# Patient Record
Sex: MALE | Race: WHITE
[De-identification: names, ages, dates, MRNs, and addresses within clinical notes are randomized per-mention and may not be internally consistent; named-entity substitution may affect disease eponyms.]

---

## 2018-09-26 ENCOUNTER — APPOINTMENT (OUTPATIENT)
Dept: DERMATOLOGY | Facility: CLINIC | Age: 53
End: 2018-09-26
Payer: COMMERCIAL

## 2018-09-26 VITALS — BODY MASS INDEX: 34.49 KG/M2 | HEIGHT: 65 IN | WEIGHT: 207 LBS

## 2018-09-26 DIAGNOSIS — D48.9 NEOPLASM OF UNCERTAIN BEHAVIOR, UNSPECIFIED: ICD-10-CM

## 2018-09-26 DIAGNOSIS — B07.9 VIRAL WART, UNSPECIFIED: ICD-10-CM

## 2018-09-26 DIAGNOSIS — L73.8 OTHER SPECIFIED FOLLICULAR DISORDERS: ICD-10-CM

## 2018-09-26 DIAGNOSIS — L91.8 OTHER HYPERTROPHIC DISORDERS OF THE SKIN: ICD-10-CM

## 2018-09-26 PROCEDURE — 99203 OFFICE O/P NEW LOW 30 MIN: CPT | Mod: 25

## 2018-09-26 PROCEDURE — 17110 DESTRUCTION B9 LES UP TO 14: CPT | Mod: 59

## 2018-09-26 PROCEDURE — 11100 BX SKIN SUBCUTANEOUS&/MUCOUS MEMBRANE 1 LESION: CPT

## 2018-09-28 ENCOUNTER — MOBILE ON CALL (OUTPATIENT)
Age: 53
End: 2018-09-28

## 2018-09-28 LAB — CORE LAB BIOPSY: NORMAL

## 2019-01-08 ENCOUNTER — APPOINTMENT (OUTPATIENT)
Dept: DERMATOLOGY | Facility: CLINIC | Age: 54
End: 2019-01-08

## 2019-05-22 ENCOUNTER — APPOINTMENT (OUTPATIENT)
Dept: PULMONOLOGY | Facility: CLINIC | Age: 54
End: 2019-05-22
Payer: MEDICARE

## 2019-05-22 VITALS
SYSTOLIC BLOOD PRESSURE: 138 MMHG | WEIGHT: 200 LBS | HEART RATE: 97 BPM | DIASTOLIC BLOOD PRESSURE: 80 MMHG | BODY MASS INDEX: 33.28 KG/M2 | OXYGEN SATURATION: 99 %

## 2019-05-22 PROCEDURE — 99214 OFFICE O/P EST MOD 30 MIN: CPT

## 2019-05-22 RX ORDER — GABAPENTIN 300 MG/1
300 CAPSULE ORAL
Refills: 0 | Status: ACTIVE | COMMUNITY

## 2019-05-22 RX ORDER — SERTRALINE HYDROCHLORIDE 50 MG/1
50 TABLET, FILM COATED ORAL
Refills: 0 | Status: ACTIVE | COMMUNITY

## 2019-05-22 RX ORDER — GEMFIBROZIL 600 MG/1
600 TABLET, FILM COATED ORAL
Refills: 0 | Status: ACTIVE | COMMUNITY

## 2019-05-22 RX ORDER — METFORMIN HYDROCHLORIDE 1000 MG/1
1000 TABLET, COATED ORAL
Qty: 180 | Refills: 0 | Status: ACTIVE | COMMUNITY
Start: 2019-03-11

## 2019-05-22 NOTE — CONSULT LETTER
[Dear  ___] : Dear  [unfilled], [Consult Letter:] : I had the pleasure of evaluating your patient, [unfilled]. [Please see my note below.] : Please see my note below. [Consult Closing:] : Thank you very much for allowing me to participate in the care of this patient.  If you have any questions, please do not hesitate to contact me. [Sincerely,] : Sincerely, [DrNikunj  ___] : Dr. RAHMAN

## 2019-07-15 NOTE — REVIEW OF SYSTEMS
[As Noted in HPI] : as noted in HPI [Snoring] : snoring [Nonrestorative Sleep] : nonrestorative sleep [Awakes With Dry Mouth] : awakes with dry mouth [Negative] : Pulmonary Hypertension [Difficulty Initiating Sleep] : no difficulty falling asleep [Difficulty Maintaining Sleep] : no difficulty maintaining sleep [Dysesthesias] : no dysesthesias [Paresthesias] : no paresthesias [Unusual Sleep Behavior] : no unusual sleep behavior [Unusual Movements] : no involuntary movements during sleep [Witnessed Apneas] : demonstrated no ~M apnea [Awakes With Headache] : awakes without a headache [Hypersomnolence] : not sleeping much more than usual [Cataplexy] :  no cataplexy [Sleep Paralysis] : no sleep paralysis [Hypnogogic Hallucinations] : no hypnogogic hallucinations [Hypnopompic Hallucinations] : no hypnopompic hallucinations
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
## 2019-07-15 NOTE — PHYSICAL EXAM
[Normal Conjunctiva] : the conjunctiva exhibited no abnormalities [Eyelids - No Xanthelasma] : the eyelids demonstrated no xanthelasmas [Low Lying Soft Palate] : low lying soft palate [Elongated Uvula] : elongated uvula [III] : III [Neck Circumference: ___] : neck circumference is [unfilled] [Heart Rate And Rhythm] : heart rate and rhythm were normal [Heart Sounds] : normal S1 and S2 [Murmurs] : no murmurs present [Respiration, Rhythm And Depth] : normal respiratory rhythm and effort [Exaggerated Use Of Accessory Muscles For Inspiration] : no accessory muscle use [Auscultation Breath Sounds / Voice Sounds] : lungs were clear to auscultation bilaterally [Abdomen Soft] : soft [Abdomen Tenderness] : non-tender [Abdomen Mass (___ Cm)] : no abdominal mass palpated [Abnormal Walk] : normal gait [Gait - Sufficient For Exercise Testing] : the gait was sufficient for exercise testing [Nail Clubbing] : no clubbing of the fingernails [Cyanosis, Localized] : no localized cyanosis [Petechial Hemorrhages (___cm)] : no petechial hemorrhages [Deep Tendon Reflexes (DTR)] : deep tendon reflexes were 2+ and symmetric [Sensation] : the sensory exam was normal to light touch and pinprick [No Focal Deficits] : no focal deficits [Oriented To Time, Place, And Person] : oriented to person, place, and time [Impaired Insight] : insight and judgment were intact [Affect] : the affect was normal [Skin Color & Pigmentation] : normal skin color and pigmentation [Skin Turgor] : normal skin turgor [] : no rash [FreeTextEntry1] : obese

## 2019-08-30 ENCOUNTER — EMERGENCY (EMERGENCY)
Facility: HOSPITAL | Age: 54
LOS: 1 days | Discharge: HOME | End: 2019-08-30
Attending: EMERGENCY MEDICINE
Payer: MEDICARE

## 2019-08-30 VITALS
WEIGHT: 179.9 LBS | TEMPERATURE: 99 F | HEIGHT: 65 IN | OXYGEN SATURATION: 98 % | HEART RATE: 85 BPM | DIASTOLIC BLOOD PRESSURE: 63 MMHG | SYSTOLIC BLOOD PRESSURE: 109 MMHG | RESPIRATION RATE: 18 BRPM

## 2019-08-30 PROCEDURE — 99284 EMERGENCY DEPT VISIT MOD MDM: CPT

## 2019-08-30 PROCEDURE — 99053 MED SERV 10PM-8AM 24 HR FAC: CPT

## 2019-08-30 NOTE — ED ADULT TRIAGE NOTE - CHIEF COMPLAINT QUOTE
patient states that he has abdominal pain without nausea and vomiting, states possible hernia protrusion

## 2019-08-31 VITALS
RESPIRATION RATE: 18 BRPM | TEMPERATURE: 98 F | DIASTOLIC BLOOD PRESSURE: 71 MMHG | HEART RATE: 66 BPM | OXYGEN SATURATION: 99 %

## 2019-08-31 LAB
ALBUMIN SERPL ELPH-MCNC: 4.2 G/DL — SIGNIFICANT CHANGE UP (ref 3.3–5.2)
ALP SERPL-CCNC: 80 U/L — SIGNIFICANT CHANGE UP (ref 40–120)
ALT FLD-CCNC: 85 U/L — HIGH
ANION GAP SERPL CALC-SCNC: 14 MMOL/L — SIGNIFICANT CHANGE UP (ref 5–17)
APPEARANCE UR: CLEAR — SIGNIFICANT CHANGE UP
APTT BLD: 33.4 SEC — SIGNIFICANT CHANGE UP (ref 27.5–36.3)
AST SERPL-CCNC: 52 U/L — HIGH
BASOPHILS # BLD AUTO: 0.05 K/UL — SIGNIFICANT CHANGE UP (ref 0–0.2)
BASOPHILS NFR BLD AUTO: 0.7 % — SIGNIFICANT CHANGE UP (ref 0–2)
BILIRUB SERPL-MCNC: 0.3 MG/DL — LOW (ref 0.4–2)
BILIRUB UR-MCNC: NEGATIVE — SIGNIFICANT CHANGE UP
BUN SERPL-MCNC: 8 MG/DL — SIGNIFICANT CHANGE UP (ref 8–20)
CALCIUM SERPL-MCNC: 9.8 MG/DL — SIGNIFICANT CHANGE UP (ref 8.6–10.2)
CHLORIDE SERPL-SCNC: 102 MMOL/L — SIGNIFICANT CHANGE UP (ref 98–107)
CO2 SERPL-SCNC: 24 MMOL/L — SIGNIFICANT CHANGE UP (ref 22–29)
COLOR SPEC: YELLOW — SIGNIFICANT CHANGE UP
CREAT SERPL-MCNC: 0.75 MG/DL — SIGNIFICANT CHANGE UP (ref 0.5–1.3)
DIFF PNL FLD: NEGATIVE — SIGNIFICANT CHANGE UP
EOSINOPHIL # BLD AUTO: 0.19 K/UL — SIGNIFICANT CHANGE UP (ref 0–0.5)
EOSINOPHIL NFR BLD AUTO: 2.7 % — SIGNIFICANT CHANGE UP (ref 0–6)
GLUCOSE SERPL-MCNC: 154 MG/DL — HIGH (ref 70–115)
GLUCOSE UR QL: NEGATIVE MG/DL — SIGNIFICANT CHANGE UP
HCT VFR BLD CALC: 41.9 % — SIGNIFICANT CHANGE UP (ref 39–50)
HGB BLD-MCNC: 13.8 G/DL — SIGNIFICANT CHANGE UP (ref 13–17)
IMM GRANULOCYTES NFR BLD AUTO: 0.1 % — SIGNIFICANT CHANGE UP (ref 0–1.5)
INR BLD: 1.07 RATIO — SIGNIFICANT CHANGE UP (ref 0.88–1.16)
KETONES UR-MCNC: NEGATIVE — SIGNIFICANT CHANGE UP
LACTATE BLDV-MCNC: 2.2 MMOL/L — HIGH (ref 0.5–2)
LEUKOCYTE ESTERASE UR-ACNC: NEGATIVE — SIGNIFICANT CHANGE UP
LIDOCAIN IGE QN: 23 U/L — SIGNIFICANT CHANGE UP (ref 22–51)
LYMPHOCYTES # BLD AUTO: 2.47 K/UL — SIGNIFICANT CHANGE UP (ref 1–3.3)
LYMPHOCYTES # BLD AUTO: 34.5 % — SIGNIFICANT CHANGE UP (ref 13–44)
MAGNESIUM SERPL-MCNC: 1.9 MG/DL — SIGNIFICANT CHANGE UP (ref 1.6–2.6)
MCHC RBC-ENTMCNC: 28 PG — SIGNIFICANT CHANGE UP (ref 27–34)
MCHC RBC-ENTMCNC: 32.9 GM/DL — SIGNIFICANT CHANGE UP (ref 32–36)
MCV RBC AUTO: 85 FL — SIGNIFICANT CHANGE UP (ref 80–100)
MONOCYTES # BLD AUTO: 0.71 K/UL — SIGNIFICANT CHANGE UP (ref 0–0.9)
MONOCYTES NFR BLD AUTO: 9.9 % — SIGNIFICANT CHANGE UP (ref 2–14)
NEUTROPHILS # BLD AUTO: 3.73 K/UL — SIGNIFICANT CHANGE UP (ref 1.8–7.4)
NEUTROPHILS NFR BLD AUTO: 52.1 % — SIGNIFICANT CHANGE UP (ref 43–77)
NITRITE UR-MCNC: NEGATIVE — SIGNIFICANT CHANGE UP
PH UR: 6.5 — SIGNIFICANT CHANGE UP (ref 5–8)
PLATELET # BLD AUTO: 213 K/UL — SIGNIFICANT CHANGE UP (ref 150–400)
POTASSIUM SERPL-MCNC: 4.1 MMOL/L — SIGNIFICANT CHANGE UP (ref 3.5–5.3)
POTASSIUM SERPL-SCNC: 4.1 MMOL/L — SIGNIFICANT CHANGE UP (ref 3.5–5.3)
PROT SERPL-MCNC: 6.7 G/DL — SIGNIFICANT CHANGE UP (ref 6.6–8.7)
PROT UR-MCNC: NEGATIVE MG/DL — SIGNIFICANT CHANGE UP
PROTHROM AB SERPL-ACNC: 12.3 SEC — SIGNIFICANT CHANGE UP (ref 10–12.9)
RBC # BLD: 4.93 M/UL — SIGNIFICANT CHANGE UP (ref 4.2–5.8)
RBC # FLD: 13.7 % — SIGNIFICANT CHANGE UP (ref 10.3–14.5)
SODIUM SERPL-SCNC: 140 MMOL/L — SIGNIFICANT CHANGE UP (ref 135–145)
SP GR SPEC: 1.01 — SIGNIFICANT CHANGE UP (ref 1.01–1.02)
UROBILINOGEN FLD QL: NEGATIVE MG/DL — SIGNIFICANT CHANGE UP
WBC # BLD: 7.16 K/UL — SIGNIFICANT CHANGE UP (ref 3.8–10.5)
WBC # FLD AUTO: 7.16 K/UL — SIGNIFICANT CHANGE UP (ref 3.8–10.5)

## 2019-08-31 PROCEDURE — 83605 ASSAY OF LACTIC ACID: CPT

## 2019-08-31 PROCEDURE — 83735 ASSAY OF MAGNESIUM: CPT

## 2019-08-31 PROCEDURE — 74177 CT ABD & PELVIS W/CONTRAST: CPT | Mod: 26

## 2019-08-31 PROCEDURE — 85610 PROTHROMBIN TIME: CPT

## 2019-08-31 PROCEDURE — 99284 EMERGENCY DEPT VISIT MOD MDM: CPT

## 2019-08-31 PROCEDURE — 80053 COMPREHEN METABOLIC PANEL: CPT

## 2019-08-31 PROCEDURE — 36415 COLL VENOUS BLD VENIPUNCTURE: CPT

## 2019-08-31 PROCEDURE — T1013: CPT

## 2019-08-31 PROCEDURE — 85027 COMPLETE CBC AUTOMATED: CPT

## 2019-08-31 PROCEDURE — 74177 CT ABD & PELVIS W/CONTRAST: CPT

## 2019-08-31 PROCEDURE — 83690 ASSAY OF LIPASE: CPT

## 2019-08-31 PROCEDURE — 81003 URINALYSIS AUTO W/O SCOPE: CPT

## 2019-08-31 PROCEDURE — 85730 THROMBOPLASTIN TIME PARTIAL: CPT

## 2019-08-31 RX ORDER — SODIUM CHLORIDE 9 MG/ML
999 INJECTION INTRAMUSCULAR; INTRAVENOUS; SUBCUTANEOUS ONCE
Refills: 0 | Status: COMPLETED | OUTPATIENT
Start: 2019-08-31 | End: 2019-08-31

## 2019-08-31 RX ADMIN — SODIUM CHLORIDE 249.75 MILLILITER(S): 9 INJECTION INTRAMUSCULAR; INTRAVENOUS; SUBCUTANEOUS at 04:19

## 2019-08-31 NOTE — ED PROVIDER NOTE - PATIENT PORTAL LINK FT
You can access the FollowMyHealth Patient Portal offered by [facility] by registering at the following website: [email or web address]. By joining [employer]s FollowMyHealth portal, you will also be able to view your health information using other applications (apps) compatible with our system.

## 2019-08-31 NOTE — ED PROVIDER NOTE - PHYSICAL EXAMINATION
Constitutional : Appears uncomfortable, talking in short sentences  Head :NC AT , no swelling  Eyes :eomi, no swelling  Mouth :mm dry,  Neck : supple, trachea in midline  Chest :Maged air entry, symm chest expansion, no distress  Heart :S1 S2 distant  Abdomen :abd distended, periumbilical erythema, umbilical hernia reducable on exam with some discomfort  Musc/Skel :ext no swelling, no deformity, no spine tenderness, distal pulses present  Neuro  :AAO 3 no focal deficits

## 2019-08-31 NOTE — ED ADULT NURSE REASSESSMENT NOTE - NS ED NURSE REASSESS COMMENT FT1
Patient received awake and alert x4, patient has had no nausea, vomiting, diarrhea.  Patient FERRARI.  Patient has no labored breathing and is in no acute distress.

## 2019-08-31 NOTE — ED PROVIDER NOTE - OBJECTIVE STATEMENT
: Corina (RN)  53yo M pt with pmhx of osteoarthritis, htn, hld, presents to the ED c/o the ed co abdominal pain beginning 2 days ago. Pt also complains of abdominal distension.   Denies fever, chills, diaphoresis, nausea, vomiting, diarrhea. 55yo M pt with pmhx of osteoarthritis, htn, hld, presents to the ED c/o the ed co abdominal pain beginning 2 days ago. Pt also complains of abdominal distension.   Denies fever, chills, diaphoresis, nausea, vomiting, diarrhea.

## 2019-08-31 NOTE — ED PROVIDER NOTE - NS ED ROS FT
no weight change, no fever or chills  no recent travel, no recent abox, no sick contacts  no recent change in medications  no rash, no bruises  no visual changes no eye discharge  no cough cold or congestion,   no sob, no chest pain  no orthopnea, no pnd  + abd pain and distension, no n/v/d  no hematuria, no change in urinary habits  no joint pain, no deformity  no headache, no paresthesia

## 2019-08-31 NOTE — ED PROVIDER NOTE - CLINICAL SUMMARY MEDICAL DECISION MAKING FREE TEXT BOX
y/o M with history of Dm presents to the ED c/o incarcerated umbilic hernia, which was reduced in ED. Plan to check lactic acid, labs. Will CT abdomen and pelvis with CT contrast and reevaluate. 55 y/o M with history of Dm presents to the ED c/o incarcerated umbilic hernia, which was reduced in ED. Plan to check lactic acid, labs. Will CT abdomen and pelvis with CT contrast and reevaluate.

## 2019-10-07 ENCOUNTER — APPOINTMENT (OUTPATIENT)
Dept: NEUROLOGY | Facility: CLINIC | Age: 54
End: 2019-10-07
Payer: MEDICARE

## 2019-10-07 VITALS
WEIGHT: 200 LBS | HEIGHT: 65 IN | DIASTOLIC BLOOD PRESSURE: 80 MMHG | BODY MASS INDEX: 33.32 KG/M2 | SYSTOLIC BLOOD PRESSURE: 116 MMHG

## 2019-10-07 DIAGNOSIS — Z86.39 PERSONAL HISTORY OF OTHER ENDOCRINE, NUTRITIONAL AND METABOLIC DISEASE: ICD-10-CM

## 2019-10-07 DIAGNOSIS — G62.9 POLYNEUROPATHY, UNSPECIFIED: ICD-10-CM

## 2019-10-07 PROCEDURE — 99204 OFFICE O/P NEW MOD 45 MIN: CPT

## 2019-10-31 ENCOUNTER — APPOINTMENT (OUTPATIENT)
Dept: NEUROLOGY | Facility: CLINIC | Age: 54
End: 2019-10-31
Payer: MEDICARE

## 2019-10-31 DIAGNOSIS — R20.2 PARESTHESIA OF SKIN: ICD-10-CM

## 2019-10-31 PROCEDURE — 95911 NRV CNDJ TEST 9-10 STUDIES: CPT

## 2019-10-31 PROCEDURE — 95886 MUSC TEST DONE W/N TEST COMP: CPT

## 2019-11-27 NOTE — ED ADULT NURSE REASSESSMENT NOTE - GASTROINTESTINAL ASSESSMENT
The patient's INR was 2.5 today and within goal. INR goal is between 2.0 and 3.0. Instructed the patient to continue his current dosing schedule and return in 2 weeks. - - -

## 2019-12-07 ENCOUNTER — OUTPATIENT (OUTPATIENT)
Dept: OUTPATIENT SERVICES | Facility: HOSPITAL | Age: 54
LOS: 1 days | End: 2019-12-07
Payer: MEDICARE

## 2019-12-07 DIAGNOSIS — G47.37 CENTRAL SLEEP APNEA IN CONDITIONS CLASSIFIED ELSEWHERE: ICD-10-CM

## 2019-12-07 PROCEDURE — 95811 POLYSOM 6/>YRS CPAP 4/> PARM: CPT | Mod: 26

## 2019-12-07 PROCEDURE — 95811 POLYSOM 6/>YRS CPAP 4/> PARM: CPT

## 2019-12-22 DIAGNOSIS — M54.12 RADICULOPATHY, CERVICAL REGION: ICD-10-CM

## 2019-12-24 PROBLEM — M54.12 CERVICAL RADICULOPATHY: Status: ACTIVE | Noted: 2019-12-24

## 2020-02-25 ENCOUNTER — APPOINTMENT (OUTPATIENT)
Dept: PULMONOLOGY | Facility: CLINIC | Age: 55
End: 2020-02-25
Payer: MEDICARE

## 2020-02-25 VITALS
WEIGHT: 200 LBS | SYSTOLIC BLOOD PRESSURE: 118 MMHG | HEIGHT: 66 IN | DIASTOLIC BLOOD PRESSURE: 80 MMHG | BODY MASS INDEX: 32.14 KG/M2 | OXYGEN SATURATION: 96 % | HEART RATE: 88 BPM | RESPIRATION RATE: 14 BRPM

## 2020-02-25 DIAGNOSIS — G47.31 PRIMARY CENTRAL SLEEP APNEA: ICD-10-CM

## 2020-02-25 PROCEDURE — 99214 OFFICE O/P EST MOD 30 MIN: CPT

## 2020-02-25 NOTE — CONSULT LETTER
[Dear  ___] : Dear  [unfilled], [Consult Letter:] : I had the pleasure of evaluating your patient, [unfilled]. [Consult Closing:] : Thank you very much for allowing me to participate in the care of this patient.  If you have any questions, please do not hesitate to contact me. [Please see my note below.] : Please see my note below. [Sincerely,] : Sincerely, [DrNikunj  ___] : Dr. RAHMAN

## 2020-02-25 NOTE — REVIEW OF SYSTEMS
[As Noted in HPI] : as noted in HPI [Snoring] : snoring [Nonrestorative Sleep] : nonrestorative sleep [Awakes With Dry Mouth] : awakes with dry mouth [Negative] : Pulmonary Hypertension [Difficulty Initiating Sleep] : no difficulty falling asleep [Difficulty Maintaining Sleep] : no difficulty maintaining sleep [Unusual Sleep Behavior] : no unusual sleep behavior [Paresthesias] : no paresthesias [Dysesthesias] : no dysesthesias [Witnessed Apneas] : demonstrated no ~M apnea [Unusual Movements] : no involuntary movements during sleep [Awakes With Headache] : awakes without a headache [Hypersomnolence] : not sleeping much more than usual [Cataplexy] :  no cataplexy [Sleep Paralysis] : no sleep paralysis [Hypnopompic Hallucinations] : no hypnopompic hallucinations [Hypnogogic Hallucinations] : no hypnogogic hallucinations
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
## 2020-02-25 NOTE — PHYSICAL EXAM
[Normal Conjunctiva] : the conjunctiva exhibited no abnormalities [Eyelids - No Xanthelasma] : the eyelids demonstrated no xanthelasmas [Low Lying Soft Palate] : low lying soft palate [Elongated Uvula] : elongated uvula [III] : III [Neck Circumference: ___] : neck circumference is [unfilled] [Heart Rate And Rhythm] : heart rate and rhythm were normal [Heart Sounds] : normal S1 and S2 [Murmurs] : no murmurs present [Respiration, Rhythm And Depth] : normal respiratory rhythm and effort [Exaggerated Use Of Accessory Muscles For Inspiration] : no accessory muscle use [Auscultation Breath Sounds / Voice Sounds] : lungs were clear to auscultation bilaterally [Abdomen Tenderness] : non-tender [Abdomen Soft] : soft [Abdomen Mass (___ Cm)] : no abdominal mass palpated [Abnormal Walk] : normal gait [Gait - Sufficient For Exercise Testing] : the gait was sufficient for exercise testing [Nail Clubbing] : no clubbing of the fingernails [Cyanosis, Localized] : no localized cyanosis [Petechial Hemorrhages (___cm)] : no petechial hemorrhages [Deep Tendon Reflexes (DTR)] : deep tendon reflexes were 2+ and symmetric [Sensation] : the sensory exam was normal to light touch and pinprick [No Focal Deficits] : no focal deficits [Oriented To Time, Place, And Person] : oriented to person, place, and time [Impaired Insight] : insight and judgment were intact [Affect] : the affect was normal [Skin Color & Pigmentation] : normal skin color and pigmentation [Skin Turgor] : normal skin turgor [] : no rash [FreeTextEntry1] : obese

## 2020-08-26 ENCOUNTER — APPOINTMENT (OUTPATIENT)
Dept: PULMONOLOGY | Facility: CLINIC | Age: 55
End: 2020-08-26

## 2020-12-31 PROBLEM — B07.9 VERRUCA: Status: ACTIVE | Noted: 2018-09-26

## 2021-07-14 ENCOUNTER — EMERGENCY (EMERGENCY)
Facility: HOSPITAL | Age: 56
LOS: 1 days | Discharge: HOME | End: 2021-07-14
Attending: STUDENT IN AN ORGANIZED HEALTH CARE EDUCATION/TRAINING PROGRAM
Payer: MEDICARE

## 2021-07-14 VITALS
SYSTOLIC BLOOD PRESSURE: 144 MMHG | OXYGEN SATURATION: 99 % | HEART RATE: 73 BPM | WEIGHT: 199.96 LBS | HEIGHT: 65 IN | RESPIRATION RATE: 20 BRPM | DIASTOLIC BLOOD PRESSURE: 83 MMHG | TEMPERATURE: 98 F

## 2021-07-14 LAB
ALBUMIN SERPL ELPH-MCNC: 4.2 G/DL — SIGNIFICANT CHANGE UP (ref 3.3–5.2)
ALP SERPL-CCNC: 67 U/L — SIGNIFICANT CHANGE UP (ref 40–120)
ALT FLD-CCNC: 30 U/L — SIGNIFICANT CHANGE UP
ANION GAP SERPL CALC-SCNC: 9 MMOL/L — SIGNIFICANT CHANGE UP (ref 5–17)
AST SERPL-CCNC: 20 U/L — SIGNIFICANT CHANGE UP
BASOPHILS # BLD AUTO: 0.04 K/UL — SIGNIFICANT CHANGE UP (ref 0–0.2)
BASOPHILS NFR BLD AUTO: 0.6 % — SIGNIFICANT CHANGE UP (ref 0–2)
BILIRUB SERPL-MCNC: 0.4 MG/DL — SIGNIFICANT CHANGE UP (ref 0.4–2)
BUN SERPL-MCNC: 6.3 MG/DL — LOW (ref 8–20)
CALCIUM SERPL-MCNC: 9.6 MG/DL — SIGNIFICANT CHANGE UP (ref 8.6–10.2)
CHLORIDE SERPL-SCNC: 105 MMOL/L — SIGNIFICANT CHANGE UP (ref 98–107)
CO2 SERPL-SCNC: 27 MMOL/L — SIGNIFICANT CHANGE UP (ref 22–29)
CREAT SERPL-MCNC: 0.64 MG/DL — SIGNIFICANT CHANGE UP (ref 0.5–1.3)
EOSINOPHIL # BLD AUTO: 0.19 K/UL — SIGNIFICANT CHANGE UP (ref 0–0.5)
EOSINOPHIL NFR BLD AUTO: 2.8 % — SIGNIFICANT CHANGE UP (ref 0–6)
GLUCOSE SERPL-MCNC: 125 MG/DL — HIGH (ref 70–99)
HCT VFR BLD CALC: 41.9 % — SIGNIFICANT CHANGE UP (ref 39–50)
HGB BLD-MCNC: 13.8 G/DL — SIGNIFICANT CHANGE UP (ref 13–17)
IMM GRANULOCYTES NFR BLD AUTO: 0.1 % — SIGNIFICANT CHANGE UP (ref 0–1.5)
LYMPHOCYTES # BLD AUTO: 1.73 K/UL — SIGNIFICANT CHANGE UP (ref 1–3.3)
LYMPHOCYTES # BLD AUTO: 25.3 % — SIGNIFICANT CHANGE UP (ref 13–44)
MAGNESIUM SERPL-MCNC: 1.9 MG/DL — SIGNIFICANT CHANGE UP (ref 1.6–2.6)
MCHC RBC-ENTMCNC: 26.7 PG — LOW (ref 27–34)
MCHC RBC-ENTMCNC: 32.9 GM/DL — SIGNIFICANT CHANGE UP (ref 32–36)
MCV RBC AUTO: 81.2 FL — SIGNIFICANT CHANGE UP (ref 80–100)
MONOCYTES # BLD AUTO: 0.52 K/UL — SIGNIFICANT CHANGE UP (ref 0–0.9)
MONOCYTES NFR BLD AUTO: 7.6 % — SIGNIFICANT CHANGE UP (ref 2–14)
NEUTROPHILS # BLD AUTO: 4.34 K/UL — SIGNIFICANT CHANGE UP (ref 1.8–7.4)
NEUTROPHILS NFR BLD AUTO: 63.6 % — SIGNIFICANT CHANGE UP (ref 43–77)
PLATELET # BLD AUTO: 177 K/UL — SIGNIFICANT CHANGE UP (ref 150–400)
POTASSIUM SERPL-MCNC: 4 MMOL/L — SIGNIFICANT CHANGE UP (ref 3.5–5.3)
POTASSIUM SERPL-SCNC: 4 MMOL/L — SIGNIFICANT CHANGE UP (ref 3.5–5.3)
PROT SERPL-MCNC: 6.6 G/DL — SIGNIFICANT CHANGE UP (ref 6.6–8.7)
RBC # BLD: 5.16 M/UL — SIGNIFICANT CHANGE UP (ref 4.2–5.8)
RBC # FLD: 14.1 % — SIGNIFICANT CHANGE UP (ref 10.3–14.5)
SODIUM SERPL-SCNC: 141 MMOL/L — SIGNIFICANT CHANGE UP (ref 135–145)
WBC # BLD: 6.83 K/UL — SIGNIFICANT CHANGE UP (ref 3.8–10.5)
WBC # FLD AUTO: 6.83 K/UL — SIGNIFICANT CHANGE UP (ref 3.8–10.5)

## 2021-07-14 PROCEDURE — 99053 MED SERV 10PM-8AM 24 HR FAC: CPT

## 2021-07-14 PROCEDURE — 93010 ELECTROCARDIOGRAM REPORT: CPT

## 2021-07-14 PROCEDURE — 85025 COMPLETE CBC W/AUTO DIFF WBC: CPT

## 2021-07-14 PROCEDURE — 82962 GLUCOSE BLOOD TEST: CPT

## 2021-07-14 PROCEDURE — 93005 ELECTROCARDIOGRAM TRACING: CPT

## 2021-07-14 PROCEDURE — 36415 COLL VENOUS BLD VENIPUNCTURE: CPT

## 2021-07-14 PROCEDURE — 80053 COMPREHEN METABOLIC PANEL: CPT

## 2021-07-14 PROCEDURE — 99284 EMERGENCY DEPT VISIT MOD MDM: CPT

## 2021-07-14 PROCEDURE — 99283 EMERGENCY DEPT VISIT LOW MDM: CPT

## 2021-07-14 PROCEDURE — 83735 ASSAY OF MAGNESIUM: CPT

## 2021-07-14 NOTE — ED ADULT TRIAGE NOTE - CHIEF COMPLAINT QUOTE
PT C/O of headache that began tonight around 4 or 5.  PT took Advil this evening but had minimum relief.  Denies visual changes, dizziness or weakness. NO facial droop noted.

## 2021-07-14 NOTE — ED PROVIDER NOTE - CARE PROVIDER_API CALL
Masoud Lamb)  Cardiovascular Disease  [address] [facility], [place] [address]  Phone: [phone]  Fax: [phone]  Follow Up Time:

## 2021-07-14 NOTE — ED PROVIDER NOTE - OBJECTIVE STATEMENT
56y M w/ hx DM, HLD, presenting for headache and palpitations.  Pt reports gradual onset bifrontal headache that started at ~1 PM today, now improved with Advil.  Pt was concerned because he has had some intermittent palpitations and b/l arm paresthesias since the headache began.  Denies fever, chills, cough, chest pain, SOB, n/v/d, weakness, vision changes.  Reports feeling improved at this time.

## 2021-07-14 NOTE — ED PROVIDER NOTE - CLINICAL SUMMARY MEDICAL DECISION MAKING FREE TEXT BOX
56y M w/ hx DM, HLD, presenting for headache and palpitations earlier today, now resolved.  Pt appears well, in no distress, nonfocal neurologic exam, stable VS.  Will check EKG, labs, reassess.

## 2021-07-14 NOTE — ED ADULT NURSE NOTE - OBJECTIVE STATEMENT
A&Ox4, RR even and unlabored. skin is warm and dry.  Pt C/O of a headache that began this afternoon.  Pt also reports an episode of palpitations and bilateral upper extremity tingling that has since resolved.  Ambulates with steady gait.  No weakness or dizziness.

## 2021-07-14 NOTE — ED PROVIDER NOTE - ATTENDING CONTRIBUTION TO CARE
55 yo male presents for evaluation of acute onset of headache. Pain was gradual in nature and associated with several symptoms which appear to be self-limited. I personally saw the patient with the resident, and completed the key components of the history and physical exam. I then discussed the management plan with the resident.

## 2021-07-14 NOTE — ED PROVIDER NOTE - PATIENT PORTAL LINK FT
You can access the FollowMyHealth Patient Portal offered by [facility] by registering at the following website: [email or web address]. By joining [employer]’s FollowMyHealth portal, you will also be able to view your health information using other applications (apps) compatible with our system.

## 2021-07-14 NOTE — ED PROVIDER NOTE - PHYSICAL EXAMINATION
Constitutional: Awake, alert, in no acute distress  Eyes: no scleral icterus, PERRL, EOMI  HENT: normocephalic, atraumatic, moist oral mucosa  Neck: supple  CV: RRR, no murmur, 2+ distal pulses in all extremities  Pulm: non-labored respirations, CTAB  Abdomen: soft, non-tender, non-distended  Extremities: no edema, no deformity  Skin: no rash, no jaundice  Neuro: AAOx3, CNs II-XII intact, no facial asymmetry, 5/5 strength and sensation in all extremities, no finger-to-nose or heel-to-shin dysmetria, stable gait.

## 2021-07-14 NOTE — ED ADULT TRIAGE NOTE - BEFAST ARM SIDE DRIFT
Patient Specific Counseling (Will Not Stick From Patient To Patient): .\\n06/10/21\\nPt seen  years ago. Prescribed tri-hema today. Recommended to apply sunscreen 100 or higher SPF daily. Will see pt as needed for refills. Detail Level: Detailed No

## 2021-07-14 NOTE — ED PROVIDER NOTE - NS ED ROS FT
Constitutional: no fever, no chills  Eyes: no vision changes  ENT: no nasal congestion, no sore throat  CV: no chest pain, +palpitations  Resp: no cough, no shortness of breath  GI: no abdominal pain, no vomiting, no diarrhea  : no dysuria  MSK: no joint pain  Skin: no rash  Neuro: +headache, no weakness, no paresthesias

## 2021-07-14 NOTE — ED PROVIDER NOTE - PROGRESS NOTE DETAILS
Phelps: Pt remains in no acute distress.  Diagnostic results discussed with pt.  Stable for discharge with outpatient cardiology f/u.

## 2021-07-14 NOTE — ED PROVIDER NOTE - NSFOLLOWUPINSTRUCTIONS_ED_ALL_ED_FT
Palpitaciones cardíacas    LO QUE NECESITA SABER:    Las palpitaciones cardíacas son sensaciones que se perciben jarvis aceleraciones, roscoe, latidos o aleteos del corazón. También podría sentir latidos adicionales, que [place] corazón no late por un corto periodo de tiempo o que se saltean los latidos. Puede percibir estas sensaciones en el pecho, la garganta o el marshall. Pueden presentarse cuando está sentado, de pie o acostado. Las palpitaciones cardíacas pueden causar temor; sin embargo, generalmente no se deben a un problema importante.    INSTRUCCIONES SOBRE EL FINA HOSPITALARIA:    Llame al 911 o pídale a alguien más que llame en cualquiera de los siguientes casos:  •Tiene alguno de los siguientes signos de un ataque cardíaco: ?Estrujamiento, presión o tensión en [place] pecho      ?Usted también podría presentar alguno de los siguientes: ?Malestar o dolor en [place] espalda, marshall, mandíbula, abdomen, o brazo      ?Falta de aliento      ?Náuseas o vómitos      ?Desvanecimiento o sudor frío repentino        •Usted tiene alguno de los siguientes signos de derrame cerebral: ?Adormecimiento o caída de un lado de [place] lobo      ?Debilidad en un brazo o dalia pierna      ?Confusión o debilidad para hablar      ?Mareos o dolor de randy intenso, o pérdida de la visión.      •Usted se desmaya o pierde el conocimiento.      Regrese a la troy de emergencias si:  •Garcia palpitaciones ocurren con más frecuencia o son más intensas.          Comuníquese con [place] médico si:  •Usted tiene nueva inflamación en garcia pies o tobillos o esta ha empeorado.      •Usted tiene preguntas o inquietudes acerca de [place] condición o cuidado.      Acuda a garcia consultas de control con [place] médico según le indicaron.Es posible que necesite un seguimiento con un cardiólogo. Simone vez deba hacerse exámenes para determinar si sufre problemas cardíacos que le causan las palpitaciones. Anote garcia preguntas para que se acuerde de hacerlas quan garcia visitas.    Mantenga un registro:Escriba cuándo garcia palpitaciones comienzan y terminan, qué estaba usted haciendo cuando comenzaron y garcia síntomas. Mantenga un registro de lo que usted comió o tomó quan las horas antes de garcia palpitaciones. Incluya todo lo que aparentemente le ayudó a que garcia síntomas mejoraran jarvis acostarse o contener [place] respiración. Chloe registro le ayudará a usted y a [place] médico para saber qué provoca garcia palpitaciones. Lleve el registro con usted a garcia citas de seguimiento.    Cómo ayudar a prevenir las palpitaciones cardíacas:  •Controlar el estrés y la ansiedad.Encuentre formas de relajarse, jarvis escuchar música, meditar o hacer yoga. El ejercicio también puede ayudar a disminuir el estrés y la ansiedad. Hablar con alguien de confianza acerca de [place] estrés o ansiedad. También puede hablar con un psicoterapeuta.      •Duerma lo suficiente cada la noche.Pregunte a [place] médico cuánto debería dormir usted cada noche.      •No tome bebidas con cafeína o alcohol.La cafeína y el alcohol pueden hacer que garcia palpitaciones empeoren. La cafeína se encuentra en refrescos, café, té, chocolate y bebidas que aumentan [place] energía.      •No fume.La nicotina y otros químicos en los cigarrillos y cigarros podrían provocar daño a [place] corazón y a garcia vasos sanguíneos. Pida información a [place] médico si usted actualmente fuma y necesita ayuda para dejar de fumar. Los cigarrillos electrónicos o el tabaco sin humo igualmente contienen nicotina. Consulte con [place] médico antes de utilizar estos productos.      •No use drogas ilegales.Hable con [place] médico si consume drogas ilegales y quiere dejarlas.

## 2021-10-20 ENCOUNTER — EMERGENCY (EMERGENCY)
Facility: HOSPITAL | Age: 56
LOS: 1 days | Discharge: HOME | End: 2021-10-20
Attending: EMERGENCY MEDICINE
Payer: MEDICARE

## 2021-10-20 VITALS
TEMPERATURE: 98 F | RESPIRATION RATE: 18 BRPM | DIASTOLIC BLOOD PRESSURE: 96 MMHG | HEART RATE: 92 BPM | SYSTOLIC BLOOD PRESSURE: 162 MMHG | OXYGEN SATURATION: 100 % | HEIGHT: 65 IN

## 2021-10-20 PROCEDURE — T1013: CPT

## 2021-10-20 PROCEDURE — 99053 MED SERV 10PM-8AM 24 HR FAC: CPT

## 2021-10-20 PROCEDURE — 99283 EMERGENCY DEPT VISIT LOW MDM: CPT

## 2021-10-20 RX ORDER — OFLOXACIN 0.3 %
5 DROPS OPHTHALMIC (EYE)
Refills: 0 | Status: DISCONTINUED | OUTPATIENT
Start: 2021-10-20 | End: 2021-10-24

## 2021-10-20 RX ADMIN — Medication 5 DROP(S): at 02:55

## 2021-10-20 NOTE — ED PROVIDER NOTE - ATTENDING CONTRIBUTION TO CARE
I personally saw the patient with the resident, and completed the key components of the history and physical exam. I then discussed the management plan with the resident.   gen in nad heent + rigth EAC inflamed small tm perfor no sign mastoiditis nml left ear TM resp clear cardiac no murmur abd soft neuro intact

## 2021-10-20 NOTE — ED PROVIDER NOTE - PHYSICAL EXAMINATION
Gen: no acute distress  Head: normocephalic, atraumatic  Ears: L TM clear, intact, R TM with small perforation to 2-oclock position with blood in canal, no active bleeding  Lung: CTAB, no respiratory distress, no wheezing, rales, rhonchi  CV: normal s1/s2, rrr,   Abd: soft, no tenderness, non-distended  MSK: No edema, no visible deformities, full range of motion in all 4 extremities  Neuro: No focal neurologic deficits  Skin: No rash   Psych: normal affect

## 2021-10-20 NOTE — ED ADULT TRIAGE NOTE - CHIEF COMPLAINT QUOTE
"bleeding out of right ear " pt states he woke up with blood coming out of right ear. denies head trauma/LOC/anticoag.

## 2021-10-20 NOTE — ED PROVIDER NOTE - OBJECTIVE STATEMENT
57 y/o M pt with pmhx of htn, hld, dm presenting today with c/o bleeding from R ear that started approx 1 hour pta. States he woke up feeling blood coming from his ear. No hx of ear bleeding in the past. Denies falls or other traumatic injury. Denies any decreased hearing, tinnitus.

## 2021-10-20 NOTE — ED PROVIDER NOTE - PATIENT PORTAL LINK FT
You can access the FollowMyHealth Patient Portal offered by [facility] by registering at the following website: [email or web address]. By joining [employer]’s FollowMyHealth portal, you will also be able to view your health information using other applications (apps) compatible with our system.

## 2021-10-20 NOTE — ED PROVIDER NOTE - CARE PROVIDER_API CALL
Manoj Fernandez)  Otolaryngology  [address] [place] [address], [place] [address]  Phone: [phone]  Fax: [phone]  Follow Up Time:

## 2021-10-20 NOTE — ED PROVIDER NOTE - NSFOLLOWUPINSTRUCTIONS_ED_ALL_ED_FT
¿Qué es dalia ruptura del tímpano?  Dalia ruptura del tímpano es un orificio o desgarre en el tímpano. El tímpano es dalia capa delgada de tejido que se encuentra entre el conducto auditivo y el oído medio (figura 1).    Las causas más comunes de ruptura del tímpano son:    ?Infecciones del oído – Pueden provocar que se acumule líquido y abdifatah presione el tímpano.    ?Cambios de presión extremos – Pueden suceder al practicar buceo si se sumerge y sube en el agua demasiado rápido. [place] se llama “barotrauma”.    ?Pinchar el tímpano – [place] sucede si introduce un hisopo o "Q-tip", dalia horquilla u otro objeto por el conducto auditivo.    ¿Cuáles son los síntomas de dalia ruptura del tímpano?  Algunas personas no tienen síntomas, sierra estos pueden incluir:    ?Un dolor de oído muy intenso    ?Dolor de oído que mejora de repente    ?Líquido transparente, color pus o sangriento que sale del oído    ?Zumbido o tintineo en el oído    ?Dificultad para oír o pérdida de la audición    ¿Es necesario que me realice pruebas?  Sí. [place] médico o enfermero puede determinar si tiene ruptura del tímpano al conocer garcia síntomas y realizarle un examen. También le hará pruebas para revisar [place] audición.    ¿Cómo se trata dalia ruptura del tímpano?  Si la causa de la ruptura del tímpano fue dalia infección, deberá robbin antibióticos. Los antibióticos podrían administrarse en forma de píldoras o en forma de líquido (en el milady de los niños). El médico también podría recetarle gotas antibióticas para el oído para prevenir infecciones de la capa interna del oído.    Si la causa de la ruptura del tímpano fue dalia lesión del oído (por ejemplo, con un hisopo o "Q-tip"), es posible que el médico solo le recete gotas antibióticas para el oído.    La mayoría de las veces, las rupturas del tímpano sanan solas, horas o días más tarde. Debe consultar al médico alrededor de dos semanas después de la primera consulta para que pueda examinar el tímpano y zacarias si ha sanado. Si no ha sanado, deberá consultar a un especialista en garganta, nariz y oídos. Es posible que el médico especialista le practique dalia cirugía para colocar un pequeño parche de papel en el tímpano y así ayudar a sellar el orificio.    También tendrá que consultar al especialista en garganta, nariz y oídos si tiene pérdida grave de audición, vómitos, mareo o debilidad facial. Las personas que presentan esos síntomas podrían necesitar cirugía.    ¿Hay algo que pueda hacer por mi cuenta para sentirme mejor?  Sí. Puede robbin dalia medicina para el dolor de venta sin receta, jarvis paracetamol (acetaminofén) (ejemplo de everton comercial: Tylenol) o ibuprofeno (ejemplos de marcas comerciales: Advil, Motrin).    ¿Se puede prevenir dalia ruptura del tímpano?  Para reducir la probabilidad de sufrir dalia ruptura del tímpano, no introduzca hisopos de algodón ni ningún otro objeto en el conducto auditivo.    Más información sobre abdifatah arleth    Educación para el paciente: Infecciones del oído (otitis media) en niños (Conceptos Básicos)    Patient education: Ear infections (otitis media) in children (Beyond the Basics)    Todos los artículos se actualizan a medida que se descubre nueva evidencia y culmina nuestro proceso de evaluación por homólogos  Abdifatah artículo se recuperó de UpToDate el: Oct 20, 2021.

## 2021-10-20 NOTE — ED PROVIDER NOTE - CLINICAL SUMMARY MEDICAL DECISION MAKING FREE TEXT BOX
Pt presenitng with R TM perforation with associated bleeding. No active bleeding currently, no hearing loss. WIll provide with ciprodex drops, and dc home with instructions for ENT f/u.

## 2022-03-24 ENCOUNTER — APPOINTMENT (OUTPATIENT)
Dept: PULMONOLOGY | Facility: CLINIC | Age: 57
End: 2022-03-24
Payer: MEDICARE

## 2022-03-24 VITALS
RESPIRATION RATE: 14 BRPM | HEIGHT: 66 IN | OXYGEN SATURATION: 98 % | SYSTOLIC BLOOD PRESSURE: 120 MMHG | DIASTOLIC BLOOD PRESSURE: 78 MMHG | WEIGHT: 200 LBS | BODY MASS INDEX: 32.14 KG/M2 | HEART RATE: 82 BPM

## 2022-03-24 DIAGNOSIS — M54.9 DORSALGIA, UNSPECIFIED: ICD-10-CM

## 2022-03-24 PROCEDURE — 99213 OFFICE O/P EST LOW 20 MIN: CPT
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
## 2022-04-13 NOTE — ED ADULT NURSE NOTE - [facility]
Post Virtual Visit Testing - [employer] scheduling       The provider who conducted your recent virtual visit has recommended additional testing for you.The testing is only valid for 3 days from your appointment. You can schedule your appointment to get tested in one of two ways:     Online at [employer] - Select the scheduling alert on the home page, or select Visits from the top navigation, then select Schedule an Appointment to get started.     With the [employer] - Tap the My Chart button on the bottom of the home screen, then tap Appointments and Schedule an Appointment.     Thank you-     [facility]      Post Virtual Visit Testing - [employer] scheduling       The provider who conducted your recent virtual visit has recommended additional testing for you.The testing is only valid for 3 days from your appointment. You can schedule your appointment to get tested in one of two ways:     Online at [employer] - Select the scheduling alert on the home page, or select Visits from the top navigation, then select Schedule an Appointment to get started.     With the [employer] - Tap the My Chart button on the bottom of the home screen, then tap Appointments and Schedule an Appointment.     Thank you-     [facility]      Pharyngitis (Sore Throat), Report Pending     Pharyngitis (sore throat) is often due to a virus. It can also be caused by strep (streptococcus) bacteria. This is often called strep throat. Both viral and strep infections can cause throat pain that is worse when swallowing, aching all over, headache, and fever. Both types of infections are contagious. They may be spread by coughing, kissing, or touching others after touching your mouth or nose.   A test has been done to find out if you or your child have strep throat. Often a rapid strep test can be done which gives immediate results and treatment can begin right away. A throat culture may also be done.  The culture results take longer. If you have a virus, the culture results will be negative. The facility will call with your culture results. Call this facility or your healthcare provider if you were not given your rapid test results for strep. If a test is positive for strep infection, you will need to take an antibiotic. An antibiotic is a medicine used to treat a bacterial infection. A prescription can be called into your pharmacy or a written copy will be given to you at that time. If both tests are negative, you likely have a virus, usually viral pharyngitis. This does not need to be treated with antibiotics. Until you receive the results of the rapid strep test or the throat culture, you should stay home from work. If your child is being tested, he or she should stay home from school.   Home care  · Rest at home. Drink plenty of fluids so you won't get dehydrated.  · If the test is positive for strep, you or your child should not go to work or school for the first 24 hours of taking the antibiotics or as directed by the healthcare provider. After this time, you or your child will not be contagious. You or your child can then return to work or school when feeling better or as directed by this facility or your healthcare provider.   · Use the antibiotic medicine (often penicillin or amoxicillin) for the full 10 days or as directed by the healthcare provider. Don't stop the medicine even if you or your child feel better. This is very important to make sure the infection is fully treated. It's also important to prevent medicine-resistant germs from growing. Treatment for 10 days is also the best way to prevent rheumatic fever which affects the heart and other parts of the body. If you or your child were given an antibiotic shot, the healthcare provider will tell you if additional antibiotics are needed.  · Use throat lozenges or numbing throat sprays to help reduce pain. Gargling with warm salt water will also  help reduce throat pain. Dissolve 1/2 teaspoon of salt in 1 glass of warm water. Discuss this treatment with your healthcare provider.  · Children can sip on juice or ice pops. Children 5 years and older can also suck on a lollipop or hard candy.  · Don't eat salty or spicy foods or give them to your child. These can irritate the throat.  Other medicine for a child:  You can give your child acetaminophen for fever, fussiness, or discomfort. In babies over 6 months of age, you may use ibuprofen instead of acetaminophen. If your child has chronic liver or kidney disease or ever had a stomach ulcer or gastrointestinal bleeding, talk with your child’s healthcare provider before giving these medicines. Aspirin should never be used by any child under 18 years of age who has a fever. It may cause severe liver damage. Always contact your child's healthcare provider before giving him or her over-the-counter medicines for the first time.   Other medicine for an adult: You may use acetaminophen or ibuprofen to control pain or fever, unless another medicine was prescribed for this. If you have chronic liver or kidney disease or ever had a stomach ulcer or gastrointestinal bleeding, talk with your healthcare provider before using these medicines.   Follow-up care  Follow up with your healthcare provider or our staff if you or your child don't feel or get better within 72 hours or as directed.   When to get medical advice  Call your healthcare provider right away if any of these occur:   · Your child has a fever (see \"Fever and children,\" below)  · You have a fever of 100.4°F (38°C) or higher, or as directed  · New or worsening ear pain, sinus pain, or headache  · Painful lumps in the back of neck  · Stiff or swollen neck  · Lymph nodes are getting larger or swelling of the neck  · Can’t open mouth wide due to throat pain  · Signs of dehydration, such as very dark urine or no urine or sunken eyes  · Noisy breathing  · Muffled  voice  · New rash  · Symptoms are worse  · New symptoms develop  Call 911  Call 911 if you have any of the following symptoms   · Can't swallow, especially saliva, with a lot of drooling  · Trouble or difficulty breathing or wheezing  · Feeling faint or dizzy  · Can't talk  · Feeling of doom  Prevention  Here are steps you can take to help prevent an infection:   · Keep good hand washing habits.  · Don’t have close contact with people who have sore throats, colds, or other upper respiratory infections.  · Don’t smoke, and stay away from secondhand smoke.  · Stay up to date with of your vaccines.  Fever and children  Use a digital thermometer to check your child’s temperature. Don’t use a mercury thermometer. There are different kinds and uses of digital thermometers. They include:   · Rectal. For children younger than 3 years, a rectal temperature is the most accurate.  · Forehead (temporal). This works for children age 3 months and older. If a child under 3 months old has signs of illness, this can be used for a first pass. The provider may want to confirm with a rectal temperature.  · Ear (tympanic). Ear temperatures are accurate after 6 months of age, but not before.  · Armpit (axillary). This is the least reliable but may be used for a first pass to check a child of any age with signs of illness. The provider may want to confirm with a rectal temperature.  · Mouth (oral). Don’t use a thermometer in your child’s mouth until he or she is at least 4 years old.  Use the rectal thermometer with care. Follow the product maker’s directions for correct use. Insert it gently. Label it and make sure it’s not used in the mouth. It may pass on germs from the stool. If you don’t feel OK using a rectal thermometer, ask the healthcare provider what type to use instead. When you talk with any healthcare provider about your child’s fever, tell him or her which type you used.   Below are guidelines to know if your young child has  a fever. Your child’s healthcare provider may give you different numbers for your child. Follow your provider’s specific instructions.   Fever readings for a baby under 3 months old:   · First, ask your child’s healthcare provider how you should take the temperature.  · Rectal or forehead: 100.4°F (38°C) or higher  · Armpit: 99°F (37.2°C) or higher  Fever readings for a child age 3 months to 36 months (3 years):   · Rectal, forehead, or ear: 102°F (38.9°C) or higher  · Armpit: 101°F (38.3°C) or higher  Call the healthcare provider in these cases:  · Repeated temperature of 104°F (40°C) or higher in a child of any age  · Fever of 100.4° (38°C) or higher in a baby younger than 3 months  · Fever that lasts more than 24 hours in a child under age 2  · Fever that lasts for 3 days in a child age 2 or older    [employer] last reviewed this educational content on 2/1/2020 © 2000-2021 [employer]. All rights reserved. This information is not intended as a substitute for professional medical care. Always follow your healthcare professional's instructions.          Coronavirus Disease 2019 (COVID-19): Overview  Coronavirus disease 2019 (COVID-19) is an illness that infects the lungs. It's caused by a type of coronavirus called SARS-CoV-2. There are many types of coronaviruses. They are a very common cause of colds and bronchitis. They can cause a lung infection called pneumonia. Symptoms can range from mild to severe. Some people have no symptoms. These viruses are also found in some animals.   The virus that causes COVID-19 changes (mutates) all the time. This is what all viruses do. It leads to different versions of a virus. These are called variants. COVID-19 variants may spread more easily from person to person. They may cause milder symptoms. Or they may cause more severe symptoms.    The virus spreads and infects people easily. It can infect a person more easily if they are not immune to it. The virus most  often spreads through droplets of fluid that a person coughs or sneezes into the air. It may be spread to you if you touch a surface with the virus on it and then touch your eyes, nose, or mouth.      To help prevent spreading the infection, wash your hands often, or use an alcohol-based hand .     For the latest information from the CDC:    · Go to the CDC website  · Call 513-XGH-FEAV (000-596-5883)    What are the symptoms of COVID-19?  Some people have no symptoms. Some have mild symptoms. And other people may have severe symptoms. Types of symptoms can vary from person to person. They may appear 2 to 14 days after contact with the virus. They can include:   · Fever  · Chills  · Coughing  · Trouble breathing or feeling short of breath  · Sore throat  · Stuffy or runny nose  · Headache  · Body aches  · Tiredness  · Nausea, vomiting, diarrhea, or abdominal pain  · New loss of sense of smell or taste  Check your symptoms with the CDC’s Coronavirus Self-.   What are possible complications of COVID-19?  The virus can cause an infection in the lungs. This is called pneumonia. In some cases, this can lead to death. Experts are still learning more about COVID-19 complications. Many other complications are possible. They include:   · Low blood pressure  · Kidney failure  · Inflammation of the brain or heart  · Rashes  Some people are at higher risk for complications. This includes:   · Older adults  · People with heart or lung disease  · People with diabetes or kidney disease  · People with health conditions that suppress the immune system  · People who take medicines that suppress the immune system  Rarely, a child may have a severe complication. This is called multisystem inflammatory syndrome in children (MIS-C). MIS-C seems to be like Kawasaki disease. This is a rare illness. It causes inflammation of blood vessels and body organs. MIS can also happen in adults. But this is less common.     How is  COVID-19 diagnosed?  Your healthcare provider will ask:  · What symptoms you have  · Where you live  · If you’ve traveled recently  · If you’ve had contact with sick people  · If you are vaccinated against COVID-19  · If you have had COVID-19   You may have 1 of these tests for COVID-19:  · Viral (molecular) test. You may also hear this called a PCR or RT-PCR test. Viral tests are very accurate. A viral test looks for the genetic material (RNA) of the SARS-CoV-2 virus. There are a few ways to do this. A swab may be wiped inside your nose or throat. Or a long swab may be put into your nose down to the back of your throat. Or a sample of your saliva may be taken. Your test results may be back in 45 minutes to a few hours. This depends on the type of test. Some tests must be sent to a lab. These can take several days for the results. Test kits you can use at home are now available. Some of these need a prescription. If you use a home kit, follow the instructions in the kit closely. Some kits show results quickly at home. Others must be sent to a lab for the results.  · Antigen test. This can find proteins from the SARS-CoV-2 virus. A swab may be wiped inside your nose or throat. Or a long swab may be put into your nose down to the back of your throat. Some results are back within 15 to 60 minutes. This depends on the type of test. Positive results are very accurate. But false positive results can happen. And the results can be negative even in people with COVID-19. This is more common in places where not many people have the virus. Antigen tests are more likely to miss a COVID-19 infection than a viral (molecular) test. You may need to have a viral test if your antigen test is negative but you have symptoms of COVID-19.  If your provider thinks or confirms that you have COVID-19, you may have other tests. These tests may include:   · Antibody blood test. This type of test can show if you had the virus in the past. It  shows antibodies for the virus in the blood. The accuracy of these tests varies. And they are not available everywhere. An antibody test may not show if you have an infection right now. This is because it can take up to a few weeks for your body to make antibodies. None of the antibody tests can yet be used to tell if a person is immune to the virus.  · Sputum culture. If you have a wet cough, you may be asked to cough up a bit of mucus (sputum) from your lungs. This is tested for the virus. It may be tested for pneumonia.  · Imaging tests. You may have a chest X-ray or CT scan.  Can you get COVID-19 again?  Yes, you can get COVID-19 more than once. You may have not gotten immunity. You could have lost the immunity. Or you may get COVID-19 from a different strain (variant) of the virus that you are not immune to. But the COVID-19 vaccine helps people who had COVID-19 lower their risk of having the illness again.   Vaccines for COVID-19  The FDA and CDC advise vaccines to help prevent COVID-19. The vaccines can also make the illness less severe. It can keep you from needing to go to the hospital.  And it can prevent the spread of the virus to other people. No vaccine is 100% effective at preventing an illness. But getting a vaccine is important. The Pfizer vaccine is available for people as young as age 5. Pregnant or breastfeeding people can have the vaccine. Ask your healthcare provider which vaccine may be best for you.   The vaccines are given as a shot (injection). This is most often done in a muscle in the upper arm. There is a 1-dose vaccine. This is from [employer]. There are 2-dose vaccines. These are from Pfizer and Moderna. For a 2-dose vaccine, the second dose is given several weeks after the first. Some people may need a third dose of Pfizer or Moderna.   Who needs a third dose of Pfizer or Moderna?  A third dose of the Pfizer or Moderna vaccine may be needed for some people. This is for people  who have a very weak immune system. The third dose is part of their first (primary) series. It's not a booster. This can happen if you had a solid organ transplant. It can be caused by other conditions or treatments. This third dose is to help a person with a weak immune system build up better protection against the virus. It's given at least 28 days after the second dose. Talk with your healthcare provider about your health risks to see if you need a third dose as part of your primary series.   COVID-19 vaccine booster shots  Everyone age 18 or older can get a COVID-19 booster shot. Here are your options.   Pfizer or Moderna booster  A booster shot of the Pfizer or Moderna vaccines is advised for many people. The booster shot is to be given at least 6 months after your primary series. Talk with your healthcare provider about your situation and risk. The CDC says these people should get a Pfizer or Moderna booster shot:     · People age 50 or older  · People age 18 or older who live in long-term care facilities    The CDC states people 18 to 49 may choose to get the booster. This depends on their specific case and risk. This includes people with certain health conditions. It also includes people whose job or living setting puts them at higher risk for COVID-19.   [employer] booster  A booster shot of the 1-dose [employer] vaccine is also available. It's advised for people ages 18 and older who got their first dose 2 or more months ago.   Mix and match  You may be able to choose which vaccine you get as a booster. This is called mix and match. Talk with your healthcare provider to learn more.   How is COVID-19 treated?  The most proven treatments right now are those to help your body while it fights the virus. This is known as supportive care. It includes:   · Getting rest.This helps your body fight the illness.  · Drinking fluids. Try to drink 6 to 8 glasses of fluids every day. Ask your provider which  drinks are best for you. Don't have drinks with caffeine or alcohol.  · Taking over-the-counter (OTC) medicine.  These are used to help ease pain and reduce fever. Ask your provider which OTC medicine is safe for you to use.  You may need to stay in the hospital for severe illness. Your care may include:   · IV (intravenous) fluids. These are given through a vein. This helps to replace fluids in your body.  · Oxygen. You may be given supplemental oxygen. Or you may be put on a breathing machine (ventilator). This is done so you get enough oxygen in your body.  · Prone positioning. Your healthcare team may regularly turn you on your stomach. This is called prone positioning. It helps increase the amount of oxygen you get to your lungs. Follow their instructions on position changes while you're in the hospital. Also follow their advice on the best positions to help your breathing once you go home.  · Remdesivir. This is an antiviral medicine. The FDA has approved it for use on COVID-19. It works by stopping the spread of the SARS-CoV-2 virus in the body. It's approved only for people who are in the hospital. It's for people 12 years and older who weigh at least 88 pounds (40 kgs). In some cases, it may also be used for people younger than 12 years or who weigh less than 88 pounds (40 kgs).  · Steroids or other anti-inflammatory medicines.  These are used to lessen the intense inflammation that some people with COVID-19 can have. The inflammation can lead to more trouble breathing. It can cause other complications or death.  · COVID-19 convalescent plasma. Plasma is the liquid part of blood. People who had COVID-19 may be asked to donate plasma. This is called COVID-19 convalescent plasma. The plasma may have antibodies. These can help fight COVID-19 in people who are very ill with it. The FDA has approved it for emergency use in some people with severe but early COVID-19. Ask your provider if you qualify to  donate.  · Monoclonal antibody therapy. The FDA approved this for emergency use in some people. They must have a positive COVID-19 viral test. They must have mild to moderate symptoms. They can’t be in the hospital. It's approved for people 12 years and older. They must weigh at least 88 pounds (40 kgs). And they must be at high risk for severe COVID-19 and a hospital stay. This includes people who are 65 years and older. And it includes people with some chronic conditions. This therapy is not approved for people who are in the hospital with COVID-19 or who need oxygen. Your healthcare team will tell you if you qualify.  Are you at risk for COVID-19?  You are at risk for COVID-19 if any of these apply to you:  · You live in an area with cases of COVID-19  · You traveled to an area with cases of COVID-19  · You had close contact with someone who had COVID-19  Close contact means being within 6 feet.   Keep in mind that COVID-19 may be spread by people who do not show symptoms.   Last updated: 11/22/2021   [employer] last reviewed this educational content on 9/1/2021 © 2000-2021 [employer]. All rights reserved. This information is not intended as a substitute for professional medical care. Always follow your healthcare professional's instructions.         No

## 2022-06-21 ENCOUNTER — APPOINTMENT (OUTPATIENT)
Dept: PULMONOLOGY | Facility: CLINIC | Age: 57
End: 2022-06-21
Payer: MEDICARE

## 2022-06-21 VITALS
WEIGHT: 200 LBS | OXYGEN SATURATION: 98 % | HEIGHT: 65 IN | SYSTOLIC BLOOD PRESSURE: 118 MMHG | DIASTOLIC BLOOD PRESSURE: 62 MMHG | HEART RATE: 80 BPM | BODY MASS INDEX: 33.32 KG/M2

## 2022-06-21 DIAGNOSIS — E66.9 OBESITY, UNSPECIFIED: ICD-10-CM

## 2022-06-21 DIAGNOSIS — G47.33 OBSTRUCTIVE SLEEP APNEA (ADULT) (PEDIATRIC): ICD-10-CM

## 2022-06-21 PROCEDURE — 99213 OFFICE O/P EST LOW 20 MIN: CPT
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
## 2022-06-21 NOTE — HISTORY REVIEWED
[History reviewed] : History reviewed. [Medications and Allergies reviewed] : Medications and allergies reviewed. Please see you primary care provider in 24-48 hours.  Return to the ER if symptoms worsen or other concerns.     Upper Respiratory Infection, Pediatric  ImageAn upper respiratory infection (URI) is an infection of the air passages that go to the lungs. The infection is caused by a type of germ called a virus. A URI affects the nose, throat, and upper air passages. The most common kind of URI is the common cold.    Follow these instructions at home:  Give medicines only as told by your child's doctor. Do not give your child aspirin or anything with aspirin in it.  Talk to your child's doctor before giving your child new medicines.  Consider using saline nose drops to help with symptoms.  Consider giving your child a teaspoon of honey for a nighttime cough if your child is older than 12 months old.  Use a cool mist humidifier if you can. This will make it easier for your child to breathe. Do not use hot steam.  Have your child drink clear fluids if he or she is old enough. Have your child drink enough fluids to keep his or her pee (urine) clear or pale yellow.  Have your child rest as much as possible.  If your child has a fever, keep him or her home from day care or school until the fever is gone.  Your child may eat less than normal. This is okay as long as your child is drinking enough.  URIs can be passed from person to person (they are contagious). To keep your child’s URI from spreading:    Wash your hands often or use alcohol-based antiviral gels. Tell your child and others to do the same.  Do not touch your hands to your mouth, face, eyes, or nose. Tell your child and others to do the same.  Teach your child to cough or sneeze into his or her sleeve or elbow instead of into his or her hand or a tissue.    Keep your child away from smoke.  Keep your child away from sick people.  Talk with your child’s doctor about when your child can return to school or .  Contact a doctor if:  Your child has a fever.  Your child's eyes are red and have a yellow discharge.  Your child's skin under the nose becomes crusted or scabbed over.  Your child complains of a sore throat.  Your child develops a rash.  Your child complains of an earache or keeps pulling on his or her ear.  Get help right away if:  Your child who is younger than 3 months has a fever of 100°F (38°C) or higher.  Your child has trouble breathing.  Your child's skin or nails look gray or blue.  Your child looks and acts sicker than before.  Your child has signs of water loss such as:    Unusual sleepiness.  Not acting like himself or herself.  Dry mouth.  Being very thirsty.  Little or no urination.  Wrinkled skin.  Dizziness.  No tears.  A sunken soft spot on the top of the head.    This information is not intended to replace advice given to you by your health care provider. Make sure you discuss any questions you have with your health care provider.    Asthma, Pediatric  ImageAsthma is a long-term (chronic) condition that causes swelling and narrowing of the airways. The airways are the breathing passages that lead from the nose and mouth down into the lungs. When asthma symptoms get worse, it is called an asthma flare. When this happens, it can be difficult for your child to breathe. Asthma flares can range from minor to life-threatening. There is no cure for asthma, but medicines and lifestyle changes can help to control it. With asthma, your child may have:    Trouble breathing (shortness of breath).  Coughing.  Noisy breathing (wheezing).    It is not known exactly what causes asthma, but certain things can bring on an asthma flare or cause asthma symptoms to get worse (triggers). Common triggers include:    Mold.  Dust.  Smoke.  Things that pollute the air outdoors, like car exhaust.  Things that pollute the air indoors, like hair sprays and fumes from household .  Things that have a strong smell.  Very cold, dry, or humid air.  Things that can cause allergy symptoms (allergens). These include pollen from grasses or trees and animal dander.  Pests, such as dust mites and cockroaches.  Stress or strong emotions.  Infections of the airways, such as common cold or flu.    Asthma may be treated with medicines and by staying away from the things that cause asthma flares. Types of asthma medicines include:    Controller medicines. These help prevent asthma symptoms. They are usually taken every day.  Fast-acting reliever or rescue medicines. These quickly relieve asthma symptoms. They are used as needed and provide short-term relief.    Follow these instructions at home:  General instructions     Give over-the-counter and prescription medicines only as told by your child’s doctor.  Use the tool that helps you measure how well your child’s lungs are working (peak flow meter) as told by your child’s doctor. Record and keep track of peak flow readings.  Understand and use the written plan that manages and treats your child’s asthma flares (asthma action plan) to help an asthma flare. Make sure that all of the people who take care of your child:    Have a copy of your child's asthma action plan.  Understand what to do during an asthma flare.  Have any needed medicines ready to give to your child, if this applies.    Trigger Avoidance     Once you know what your child’s asthma triggers are, take actions to avoid them. This may include avoiding a lot of exposure to:    Dust and mold.    Dust and vacuum your home 1–2 times per week when your child is not home. Use a high-efficiency particulate arrestance (HEPA) vacuum, if possible.  Replace carpet with wood, tile, or vinyl page, if possible.  Change your heating and air conditioning filter at least once a month. Use a HEPA filter, if possible.  Throw away plants if you see mold on them.  Clean bathrooms and eflecia with bleach. Repaint the walls in these rooms with mold-resistant paint. Keep your child out of the rooms you are cleaning and painting.  Limit your child's plush toys to 1–2. Wash them monthly with hot water and dry them in a dryer.  Use allergy-proof pillows, mattress covers, and box spring covers.  Wash bedding every week in hot water and dry it in a dryer.  Use blankets that are made of polyester or cotton.    Pet dander. Have your child avoid contact with any animals that he or she is allergic to.  Allergens and pollens from any grasses, trees, or other plants that your child is allergic to. Have your child avoid spending a lot of time outdoors when pollen counts are high, and on very windy days.  Foods that have high amounts of sulfites.  Strong smells, chemicals, and fumes.  Smoke.    Do not allow your child to smoke. Talk to your child about the risks of smoking.  Have your child avoid being around smoke. This includes campfire smoke, forest fire smoke, and secondhand smoke from tobacco products. Do not smoke or allow others to smoke in your home or around your child.    Pests and pest droppings. These include dust mites and cockroaches.  Certain medicines. These include NSAIDs. Always talk to your child’s doctor before stopping or starting any new medicines.    Making sure that you, your child, and all household members wash their hands often will also help to control some triggers. If soap and water are not available, use hand .    Contact a doctor if:  Your child has wheezing, shortness of breath, or a cough that is not getting better with medicine.  The mucus your child coughs up (sputum) is yellow, green, gray, bloody, or thicker than usual.  Your child’s medicines cause side effects, such as:    A rash.  Itching.  Swelling.  Trouble breathing.    Your child needs reliever medicines more often than 2–3 times per week.  Your child's peak flow measurement is still at 50–79% of his or her personal best (yellow zone) after following the action plan for 1 hour.  Your child has a fever.  Get help right away if:  Your child's peak flow is less than 50% of his or her personal best (red zone).  Your child is getting worse and does not respond to treatment during an asthma flare.  Your child is short of breath at rest or when doing very little physical activity.  Your child has trouble eating, drinking, or talking.  Your child has chest pain.  Your child’s lips or fingernails look blue or gray.  Your child is light-headed or dizzy, or your child faints.  Your child who is younger than 3 months has a temperature of 100°F (38°C) or higher.  This information is not intended to replace advice given to you by your health care provider. Make sure you discuss any questions you have with your health care provider.    Vomiting, Child  Vomiting occurs when stomach contents are thrown up and out of the mouth. Many children notice nausea before vomiting. Vomiting can make your child feel weak and cause dehydration. Dehydration can make your child tired and thirsty, cause your child to have a dry mouth, and decrease how often your child urinates. It is important to treat your child’s vomiting as told by your child’s health care provider.    Follow these instructions at home:  Follow instructions from your child's health care provider about how to care for your child at home.    Eating and drinking     Follow these recommendations as told by your child's health care provider:    Give your child an oral rehydration solution (ORS). This is a drink that is sold at pharmacies and retail stores.  Continue to breastfeed or bottle-feed your young child. Do this frequently, in small amounts. Gradually increase the amount. Do not give your infant extra water.  Encourage your child to eat soft foods in small amounts every 3–4 hours, if your child is eating solid food. Continue your child’s regular diet, but avoid spicy or fatty foods, such as french fries and pizza.  Encourage your child to drink clear fluids, such as water, low-calorie popsicles, and fruit juice that has water added (diluted fruit juice). Have your child drink small amounts of clear fluids slowly. Gradually increase the amount.  Avoid giving your child fluids that contain a lot of sugar or caffeine, such as sports drinks and soda.    General instructions     Make sure that you and your child wash your hands frequently with soap and water. If soap and water are not available, use hand . Make sure that everyone in your child's household washes their hands frequently.  Give over-the-counter and prescription medicines only as told by your child's health care provider.  Watch your child’s condition for any changes.  Keep all follow-up visits as told by your child's health care provider. This is important.  Contact a health care provider if:  Image   Your child has a fever.  Your child will not drink fluids or cannot keep fluids down.  Your child is light-headed or dizzy.  Your child has a headache.  Your child has muscle cramps.  Get help right away if:  You notice signs of dehydration in your child, such as:    No urine in 8–12 hours.  Cracked lips.  Not making tears while crying.  Dry mouth.  Sunken eyes.  Sleepiness.  Weakness.    Your child’s vomiting lasts more than 24 hours.  Your child’s vomit is bright red or looks like black coffee grounds.  Your child has stools that are bloody or black, or stools that look like tar.  Your child has a severe headache, a stiff neck, or both.  Your child has abdominal pain.  Your child has difficulty breathing or is breathing very quickly.  Your child’s heart is beating very quickly.  Your child feels cold and clammy.  Your child seems confused.  You are unable to wake up your child.  Your child has pain while urinating.  This information is not intended to replace advice given to you by your health care provider. Make sure you discuss any questions you have with your health care provider.

## 2022-09-18 ENCOUNTER — EMERGENCY (EMERGENCY)
Facility: HOSPITAL | Age: 57
LOS: 1 days | Discharge: LEFT BEFORE DISCHARGE | End: 2022-09-18
Attending: STUDENT IN AN ORGANIZED HEALTH CARE EDUCATION/TRAINING PROGRAM
Payer: MEDICARE

## 2022-09-18 VITALS
RESPIRATION RATE: 20 BRPM | DIASTOLIC BLOOD PRESSURE: 74 MMHG | SYSTOLIC BLOOD PRESSURE: 130 MMHG | HEART RATE: 79 BPM | TEMPERATURE: 98 F | OXYGEN SATURATION: 98 % | HEIGHT: 65 IN

## 2022-09-18 PROCEDURE — 99283 EMERGENCY DEPT VISIT LOW MDM: CPT

## 2022-09-18 PROCEDURE — 99053 MED SERV 10PM-8AM 24 HR FAC: CPT

## 2022-09-18 RX ORDER — ACETAMINOPHEN 500 MG
650 TABLET ORAL ONCE
Refills: 0 | Status: DISCONTINUED | OUTPATIENT
Start: 2022-09-18 | End: 2022-09-23

## 2022-09-18 NOTE — ED STATDOCS - CLINICAL SUMMARY MEDICAL DECISION MAKING FREE TEXT BOX
58 y/o male with pmhx of HTN and DM on metformin, c/o headache x 5 days. pt neuro intact but new onset of HA for 1 week. Will do CT head and reassess.

## 2022-09-18 NOTE — ED STATDOCS - NS ED ATTENDING STATEMENT MOD
This was a shared visit with the REENA. I reviewed and verified the documentation and independently performed the documented:

## 2022-09-18 NOTE — ED STATDOCS - PHYSICAL EXAMINATION
Const: Awake, alert and oriented. In no acute distress. Well appearing.  HEENT: NC/AT. Moist mucous membranes.  Eyes: No scleral icterus. EOMI.  Neck:. Soft and supple. Full ROM without pain.  Cardiac: Regular rate and regular rhythm. +S1/S2. Peripheral pulses 2+ and symmetric. No LE edema.  Resp: Speaking in full sentences. No evidence of respiratory distress. No wheezes, rales or rhonchi.  Abd: Soft, non-tender, non-distended. Normal bowel sounds in all 4 quadrants. No guarding or rebound.  Back: Spine midline and non-tender. No CVAT.  Skin: No rashes, abrasions or lacerations.  Lymph: No cervical lymphadenopathy.  Neuro: A&Ox3, moving all extremities symmetrically, follows commands, motor jordon upper and lower ext 5/5, sensory symm and intact CN 2-12 grossly intact, no ataxia, no nystagmus, no dysmetria, no ddk, symm jordon, no pronator drift

## 2022-09-18 NOTE — ED STATDOCS - OBJECTIVE STATEMENT
58 y/o male with pmhx of HTN and DM on metformin, c/o headache x 5 days. Pt states occasional headache but never this pain. Pt states feels "like pressure in head."  Pt denies loc, cp/sob/palp, cough, abd pain/n/v/d, light sensitively. Pt states having some stress from, unemployed, health issues and unable to sleep sometimes.  Denies SI/HI. No hx of drinking, smoking or drug use.

## 2022-09-19 PROCEDURE — 99282 EMERGENCY DEPT VISIT SF MDM: CPT

## 2022-12-01 ENCOUNTER — APPOINTMENT (OUTPATIENT)
Dept: PULMONOLOGY | Facility: CLINIC | Age: 57
End: 2022-12-01

## 2023-07-20 NOTE — ED PROVIDER NOTE - NSICDXPASTMEDICALHX_GEN_ALL_CORE_FT
PAST MEDICAL HISTORY:  Depression     Diabetes     Hyperlipidemia     Hypertension     OA (osteoarthritis of spine)     
on the discharge service for the patient. I have reviewed and made amendments to the documentation where necessary.

## 2024-02-13 ENCOUNTER — EMERGENCY (EMERGENCY)
Facility: HOSPITAL | Age: 59
LOS: 0 days | Discharge: HOME | End: 2024-02-13
Attending: EMERGENCY MEDICINE
Payer: COMMERCIAL

## 2024-02-13 VITALS
OXYGEN SATURATION: 97 % | TEMPERATURE: 98 F | DIASTOLIC BLOOD PRESSURE: 69 MMHG | SYSTOLIC BLOOD PRESSURE: 142 MMHG | HEART RATE: 84 BPM | RESPIRATION RATE: 18 BRPM

## 2024-02-13 VITALS
OXYGEN SATURATION: 99 % | TEMPERATURE: 98 F | HEART RATE: 79 BPM | RESPIRATION RATE: 17 BRPM | DIASTOLIC BLOOD PRESSURE: 78 MMHG | HEIGHT: 64 IN | SYSTOLIC BLOOD PRESSURE: 136 MMHG | WEIGHT: 199.96 LBS
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
## 2024-02-13 DIAGNOSIS — M54.9 DORSALGIA, UNSPECIFIED: ICD-10-CM

## 2024-02-13 DIAGNOSIS — M79.602 PAIN IN LEFT ARM: ICD-10-CM

## 2024-02-13 DIAGNOSIS — E78.5 HYPERLIPIDEMIA, UNSPECIFIED: ICD-10-CM

## 2024-02-13 DIAGNOSIS — S40.012A CONTUSION OF LEFT SHOULDER, INITIAL ENCOUNTER: ICD-10-CM

## 2024-02-13 DIAGNOSIS — E11.9 TYPE 2 DIABETES MELLITUS WITHOUT COMPLICATIONS: ICD-10-CM

## 2024-02-13 PROCEDURE — 99284 EMERGENCY DEPT VISIT MOD MDM: CPT

## 2024-02-13 PROCEDURE — 73030 X-RAY EXAM OF SHOULDER: CPT | Mod: LT

## 2024-02-13 PROCEDURE — 73030 X-RAY EXAM OF SHOULDER: CPT | Mod: 26,LT

## 2024-02-13 PROCEDURE — 99283 EMERGENCY DEPT VISIT LOW MDM: CPT | Mod: 25

## 2024-02-13 RX ORDER — IBUPROFEN 200 MG
600 TABLET ORAL ONCE
Refills: 0 | Status: COMPLETED | OUTPATIENT
Start: 2024-02-13 | End: 2024-02-13

## 2024-02-13 RX ORDER — CYCLOBENZAPRINE HYDROCHLORIDE 10 MG/1
1 TABLET, FILM COATED ORAL
Qty: 20 | Refills: 0
Start: 2024-02-13 | End: 2024-02-19

## 2024-02-13 RX ORDER — IBUPROFEN 200 MG
1 TABLET ORAL
Qty: 20 | Refills: 0
Start: 2024-02-13 | End: 2024-02-17

## 2024-02-13 RX ORDER — CYCLOBENZAPRINE HYDROCHLORIDE 10 MG/1
10 TABLET, FILM COATED ORAL ONCE
Refills: 0 | Status: COMPLETED | OUTPATIENT
Start: 2024-02-13 | End: 2024-02-13

## 2024-02-13 RX ADMIN — CYCLOBENZAPRINE HYDROCHLORIDE 10 MILLIGRAM(S): 10 TABLET, FILM COATED ORAL at 10:39

## 2024-02-13 RX ADMIN — Medication 600 MILLIGRAM(S): at 10:38
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
## 2024-02-13 NOTE — ED STATDOCS - ATTENDING APP SHARED VISIT CONTRIBUTION OF CARE
I, Sumaya Ricketts MD,  performed the initial face to face bedside interview with this patient regarding history of present illness, review of symptoms and relevant past medical, social and family history.  I completed an independent physical examination.  I was the initial provider who evaluated this patient.   I personally saw the patient and performed a substantive portion of the visit including all aspects of the medical decision making.  I have signed out the follow up of any pending tests (i.e. labs, radiological studies) to the REENA.  I have communicated the patient’s plan of care and disposition with the REENA.  The history, relevant review of systems, past medical and surgical history, medical decision making, and physical examination was documented by the scribe in my presence and I attest to the accuracy of the documentation.
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
## 2024-02-13 NOTE — ED STATDOCS - PATIENT PORTAL LINK FT
You can access the FollowMyHealth Patient Portal offered by [facility] by registering at the following website: [email or web address]. By joining [employer]’s FollowMyHealth portal, you will also be able to view your health information using other applications (apps) compatible with our system.

## 2024-02-13 NOTE — ED ADULT NURSE NOTE - CHPI ED NUR RELIEVING FX
The patient is a 73y Female complaining of hypotension. none Glycopyrrolate Counseling:  I discussed with the patient the risks of glycopyrrolate including but not limited to skin rash, drowsiness, dry mouth, difficulty urinating, and blurred vision.
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
## 2024-02-15 ENCOUNTER — OFFICE (OUTPATIENT)
Dept: URBAN - METROPOLITAN AREA CLINIC 94 | Facility: CLINIC | Age: 59
Setting detail: OPHTHALMOLOGY
End: 2024-02-15
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
## 2024-02-15 PROCEDURE — NO SHOW FE NO SHOW FEE: Performed by: OPHTHALMOLOGY

## 2024-02-16 RX ORDER — METFORMIN HYDROCHLORIDE 850 MG/1
1 TABLET ORAL
Refills: 0 | DISCHARGE

## 2024-02-16 RX ORDER — ATORVASTATIN CALCIUM 80 MG/1
1 TABLET, FILM COATED ORAL
Refills: 0 | DISCHARGE

## 2024-03-04 ENCOUNTER — OFFICE (OUTPATIENT)
Dept: URBAN - METROPOLITAN AREA CLINIC 94 | Facility: CLINIC | Age: 59
Setting detail: OPHTHALMOLOGY
End: 2024-03-04
Payer: COMMERCIAL

## 2024-03-04 DIAGNOSIS — H01.004: ICD-10-CM

## 2024-03-04 DIAGNOSIS — E11.9: ICD-10-CM

## 2024-03-04 DIAGNOSIS — H01.002: ICD-10-CM

## 2024-03-04 DIAGNOSIS — H52.4: ICD-10-CM

## 2024-03-04 DIAGNOSIS — H01.001: ICD-10-CM

## 2024-03-04 PROBLEM — H18.413 ARCUS SENILIS; BOTH EYES: Status: ACTIVE | Noted: 2024-03-04

## 2024-03-04 PROCEDURE — 92015 DETERMINE REFRACTIVE STATE: CPT | Performed by: OPHTHALMOLOGY

## 2024-03-04 PROCEDURE — 92014 COMPRE OPH EXAM EST PT 1/>: CPT | Performed by: OPHTHALMOLOGY

## 2024-03-04 ASSESSMENT — REFRACTION_MANIFEST
OD_ADD: +2.00
OD_SPHERE: +0.75
OS_VA1: 20/20-
OS_ADD: +2.00
OS_CYLINDER: -0.50
OD_VA1: 20/20-
OS_AXIS: 105
OD_CYLINDER: -0.50
OD_AXIS: 95
OU_VA: 2020
OS_SPHERE: +0.75

## 2024-03-04 ASSESSMENT — LID EXAM ASSESSMENTS
OS_BLEPHARITIS: LLL LUL 1+
OD_BLEPHARITIS: RLL RUL 1+

## 2024-03-04 ASSESSMENT — SPHEQUIV_DERIVED
OS_SPHEQUIV: 0.5
OD_SPHEQUIV: 0.5

## 2024-09-10 ENCOUNTER — APPOINTMENT (OUTPATIENT)
Age: 59
End: 2024-09-10

## 2024-09-23 ENCOUNTER — APPOINTMENT (OUTPATIENT)
Age: 59
End: 2024-09-23
Payer: MEDICARE

## 2024-09-23 VITALS — WEIGHT: 198 LBS | HEIGHT: 65 IN | BODY MASS INDEX: 32.99 KG/M2

## 2024-09-23 DIAGNOSIS — E11.40 TYPE 2 DIABETES MELLITUS WITH DIABETIC NEUROPATHY, UNSPECIFIED: ICD-10-CM

## 2024-09-23 DIAGNOSIS — M79.671 PAIN IN RIGHT FOOT: ICD-10-CM

## 2024-09-23 DIAGNOSIS — B35.1 TINEA UNGUIUM: ICD-10-CM

## 2024-09-23 DIAGNOSIS — E78.00 PURE HYPERCHOLESTEROLEMIA, UNSPECIFIED: ICD-10-CM

## 2024-09-23 DIAGNOSIS — M79.672 PAIN IN LEFT FOOT: ICD-10-CM

## 2024-09-23 PROCEDURE — 99203 OFFICE O/P NEW LOW 30 MIN: CPT | Mod: 25

## 2024-09-23 PROCEDURE — 11721 DEBRIDE NAIL 6 OR MORE: CPT | Mod: Q8

## 2024-09-23 NOTE — PHYSICAL EXAM
[General Appearance - Alert] : alert [General Appearance - In No Acute Distress] : in no acute distress [Ankle Swelling (On Exam)] : not present [Varicose Veins Of Lower Extremities] : not present [Delayed in the Right Toes] : capillary refills normal in right toes [Delayed in the Left Toes] : capillary refills normal in the left toes [2+] : left foot dorsalis pedis 2+ [No Joint Swelling] : no joint swelling [] : normal strength/tone [Normal Foot/Ankle] : Both lower extremities were exposed and visualized. Standing exam demonstrates normal foot posture and alignment. Hindfoot exam shows no hindfoot valgus or varus [Vibration Dec.] : normal vibratory sensation at the level of the toes [FreeTextEntry1] : intermittent burning sensation B/L LE [Impaired Insight] : insight and judgment were intact [Oriented To Time, Place, And Person] : oriented to person, place, and time [Affect] : the affect was normal

## 2024-09-23 NOTE — HISTORY OF PRESENT ILLNESS
[FreeTextEntry1] : Patient was diagnosed with diabetes in 2015. He was referred by PCP for diabetic foot exam.  Patient experiences burning sensation sometimes. Patient denies any concerns.  FBS: 129 A1C: around 7 August 2024

## 2024-09-23 NOTE — ASSESSMENT
[FreeTextEntry1] : Discussed diagnosis and treatment with patient  Discussed etiology of symptoms patient is experiencing  Aseptic debridement of nails 1-5 bilateral reducing the length with a sterile nail clipper manually and reduced girth by power eden  Discussed proper shoe gear with patient  Discussed the importance of alternating shoe gear every other day  Discussed the importance of appropriate moisture balance  Rec Vit B complex supplements f/u PCP re a1c  Patient to return to the office in 3 months